# Patient Record
Sex: FEMALE
[De-identification: names, ages, dates, MRNs, and addresses within clinical notes are randomized per-mention and may not be internally consistent; named-entity substitution may affect disease eponyms.]

---

## 2019-03-17 ENCOUNTER — HOSPITAL ENCOUNTER (EMERGENCY)
Dept: HOSPITAL 14 - H.ER | Age: 54
LOS: 1 days | Discharge: HOME | End: 2019-03-18
Payer: MEDICAID

## 2019-03-17 DIAGNOSIS — F32.9: Primary | ICD-10-CM

## 2019-03-17 DIAGNOSIS — I10: ICD-10-CM

## 2019-03-17 DIAGNOSIS — Z88.0: ICD-10-CM

## 2019-03-17 DIAGNOSIS — Z00.8: ICD-10-CM

## 2019-03-17 DIAGNOSIS — Z86.59: ICD-10-CM

## 2019-03-17 PROCEDURE — 99282 EMERGENCY DEPT VISIT SF MDM: CPT

## 2019-03-18 VITALS
RESPIRATION RATE: 16 BRPM | HEART RATE: 100 BPM | OXYGEN SATURATION: 100 % | TEMPERATURE: 98 F | DIASTOLIC BLOOD PRESSURE: 105 MMHG | SYSTOLIC BLOOD PRESSURE: 165 MMHG

## 2019-03-18 NOTE — ED PDOC
HPI: Psych/Substance Abuse


Time Seen by Provider: 19 00:20


Chief Complaint (Nursing): Psychiatric Evaluation


Chief Complaint (Provider): Psychiatric Evaluation


History Per: Patient, Family (daughter)


History/Exam Limitations: no limitations


Associated Symptoms: Depression


Additional Complaint(s): 





52 y/o female with history of depression and anxiety presenting to the ED for 

psychiatric evaluation. Patient is from [place] and has been here for

x2 months visiting her daughter. Patient reports she did not bring her 

psychiatric medications and is unaware of the names. Per daughter patient 

appears to be increasingly depressed and is unable to sleep. Patient denies 

homicidal or suicidal ideation as well as auditory or visual hallucinations. 

Patient and her daughter's main concern is to see a psychiatrist and get some 

sleep aide.





Past Medical History


Reviewed: Historical Data, Nursing Documentation, Vital Signs


Vital Signs: 





                                Last Vital Signs











Temp  98 F   19 23:59


 


Pulse  100 H  19 23:59


 


Resp  16   19 23:59


 


BP  165/105 H  19 23:59


 


Pulse Ox  100   19 23:59














- Medical History


PMH: Anxiety, Depression, HTN





- Surgical History


Surgical History: 


Other surgeries: Tubal Ligation, Hysterectomy





- Family History


Family History: States: Unknown Family Hx





- Social History


Current smoker - smoking cessation education provided: No


Alcohol: None


Drugs: Denies





- Home Medications


Home Medications: 


                                Ambulatory Orders











 Medication  Instructions  Recorded


 


hydrOXYzine Pamoate [Vistaril] 25 mg PO HS PRN #7 cap 19














- Allergies


Allergies/Adverse Reactions: 


                                    Allergies











Allergy/AdvReac Type Severity Reaction Status Date / Time


 


Penicillins Allergy  RASH Verified 19 00:03














Review of Systems


ROS Statement: Except As Marked, All Systems Reviewed And Found Negative


Psych: Positive for: Depression.  Negative for: Psychosis, Suicidal ideation





Physical Exam





- Reviewed


Nursing Documentation Reviewed: Yes


Vital Signs Reviewed: Yes





- Physical Exam


Appears: Positive for: Well, Non-toxic, No Acute Distress


Head Exam: Positive for: ATRAUMATIC, NORMAL INSPECTION, NORMOCEPHALIC


Skin: Positive for: Normal Color, Warm, DRY


Eye Exam: Positive for: EOMI, Normal appearance, PERRL


ENT: Positive for: Normal ENT Inspection


Neck: Positive for: Normal, Painless ROM


Cardiovascular/Chest: Positive for: Regular Rate, Rhythm.  Negative for: Murmur


Respiratory: Positive for: Normal Breath Sounds.  Negative for: Respiratory 

Distress


Gastrointestinal/Abdominal: Positive for: Normal Exam, Soft.  Negative for: 

Tenderness


Back: Positive for: Normal Inspection


Extremity: Positive for: Normal ROM.  Negative for: Pedal Edema, Deformity


Neurological/Psych: Positive for: Awake, Alert, Normal Tone, Mood/Affect (flat).

  Negative for: Motor/Sensory Deficits





- ECG


O2 Sat by Pulse Oximetry: 100 (RA)


Pulse Ox Interpretation: Normal





Medical Decision Making


Medical Decision Making: 





Time: 00:20


Impression: 52 y/o with insomnia in setting of depression


Initial Plan:


* Crisis evaluation





01:24


Patient was cleared by crisis. Diagnosis is depression. Will be discharged home.

 











------------------------------------------------------------------------------

-------------------


Scribe Attestation:


Documented by Sandro Dunbar, acting as a scribe Krista Branham MD. 





Provider Scribe Attestation:


All medical record entries made by the Scribe were at my direction and 

personally dictated by me. I have reviewed the chart and agree that the record 

accurately reflects my personal performance of the history, physical exam, 

medical decision making, and the department course for this patient. I have also

 personally directed, reviewed, and agree with the discharge instructions and 

disposition.





Disposition





- Clinical Impression


Clinical Impression: 


 Depression, Insomnia








- Patient ED Disposition


Is Patient to be Admitted: No





- Disposition


Disposition: Routine/Home


Disposition Time: 01:24


Condition: STABLE


Additional Instructions: 





NANCIE MCELROY, thank you for letting us take care of you today. Your 

provider was Nitin Branham MD and you were treated for PSYCH EVAL. The 

emergency medical care you received today was directed at your acute symptoms. 

If you were prescribed any medication, please fill it and take as directed. It 

may take several days for your symptoms to resolve. Return to the Emergency 

Department if your symptoms worsen, do not improve, or if you have any other 

problems.





Please contact your doctor or call one of the physicians/clinics you have been 

referred to that are listed on the Patient Visit Information form that is 

included in your discharge packet. Bring any paperwork you were given at 

discharge with you along with any medications you are taking to your follow up 

visit. Our treatment cannot replace ongoing medical care by a primary care 

provider outside of the emergency department.





Thank you for allowing the [facility] team to be part of your care today.








If you had an X-Ray or CT scan: A Radiologist will review the ED reading if any 

change in treatment is needed we will contact you.***





If you had a blood, urine, or wound culture: It will take several days for the 

results, if any change in treatment is needed we will contact you.***





If you had an STI test: It will take 48 hours for the results. Please call after

 1 week if you have not heard back.***


Prescriptions: 


hydrOXYzine Pamoate [Vistaril] 25 mg PO HS PRN #7 cap


 PRN Reason: for insomnia


Instructions:  Depression


Forms:  [employer] (English)


Print Language: Belarusian

## 2019-03-23 ENCOUNTER — HOSPITAL ENCOUNTER (INPATIENT)
Dept: HOSPITAL 14 - H.ER | Age: 54
LOS: 6 days | Discharge: HOME | DRG: 753 | End: 2019-03-29
Attending: PSYCHIATRY & NEUROLOGY | Admitting: PSYCHIATRY & NEUROLOGY
Payer: MEDICAID

## 2019-03-23 DIAGNOSIS — I10: ICD-10-CM

## 2019-03-23 DIAGNOSIS — Z98.891: ICD-10-CM

## 2019-03-23 DIAGNOSIS — F05: ICD-10-CM

## 2019-03-23 DIAGNOSIS — N39.0: ICD-10-CM

## 2019-03-23 DIAGNOSIS — F31.64: Primary | ICD-10-CM

## 2019-03-23 DIAGNOSIS — F41.9: ICD-10-CM

## 2019-03-23 PROCEDURE — GZ3ZZZZ MEDICATION MANAGEMENT: ICD-10-PCS | Performed by: PSYCHIATRY & NEUROLOGY

## 2019-03-23 PROCEDURE — GZ56ZZZ INDIVIDUAL PSYCHOTHERAPY, SUPPORTIVE: ICD-10-PCS | Performed by: PSYCHIATRY & NEUROLOGY

## 2019-03-23 PROCEDURE — GZHZZZZ GROUP PSYCHOTHERAPY: ICD-10-PCS | Performed by: PSYCHIATRY & NEUROLOGY

## 2019-03-23 NOTE — ED PDOC
HPI: Psych/Substance Abuse


Time Seen by Provider: 19 21:00


Chief Complaint (Nursing): Psychiatric Evaluation


Chief Complaint (Provider): psychiatric evaluation


History Per: Family


History/Exam Limitations: no limitations


Onset/Duration Of Symptoms: Days


Current Symptoms Are (Timing): Still Present


Associated Symptoms: denies: Suicidal Thoughts


Additional Complaint(s): 





Tanvi Benavidez is a 53 year old female, with a past medical history of 

psychiatric illness, who presents to the emergency department accompanied by 

family members for psychiatric evaluation. Family members report that patient 

arrived from [place] x2 months ago where she was receiving a 

treatment of Lithium Carbonate 200mg, Lamotrigine 100mg and Olanzapine 10mg. 

Family states that patient forgot her medication at home and hasn't been 

sleeping for x2 days. Patient was seen here last week and was given Benadryl but

with no benefit. Patient has a scheduled appointment with psychiatrist at [facility] in [place] on  but family is requesting a medication

to help her sleep due to concern of child at home. Patient denies any suicidal 

or homicidal ideation. No further medical complaints.





PMD: None provided. 





Past Medical History


Reviewed: Historical Data, Nursing Documentation, Vital Signs


Vital Signs: 





                                Last Vital Signs











Temp  98.7 F   19 20:33


 


Pulse  89   19 20:33


 


Resp  16   19 20:33


 


BP  150/95 H  19 20:33


 


Pulse Ox  100   19 20:33














- Medical History


PMH: Anxiety, Depression, HTN


   Denies: Diabetes, Hepatitis, HIV, Seizures, Sexually Transmitted Disease





- Surgical History


Surgical History: 





- Family History


Family History: States: Unknown Family Hx





- Living Arrangements


Living Arrangements: With Family





- Home Medications


Home Medications: 


                                Ambulatory Orders











 Medication  Instructions  Recorded


 


hydrOXYzine Pamoate [Vistaril] 25 mg PO HS PRN #7 cap 19














- Allergies


Allergies/Adverse Reactions: 


                                    Allergies











Allergy/AdvReac Type Severity Reaction Status Date / Time


 


Penicillins Allergy  RASH Verified 19 00:03














Review of Systems


ROS Statement: Except As Marked, All Systems Reviewed And Found Negative


Psych: Positive for: Other (insomnia).  Negative for: Suicidal ideation (or 

homicidal ideation)





Physical Exam





- Reviewed


Nursing Documentation Reviewed: Yes


Vital Signs Reviewed: Yes





- Physical Exam


Appears: Positive for: No Acute Distress


Head Exam: Positive for: ATRAUMATIC, NORMAL INSPECTION, NORMOCEPHALIC


Skin: Positive for: Normal Color, Warm, Dry


Eye Exam: Positive for: Normal appearance, EOMI, PERRL


Neck: Positive for: Normal, Painless ROM


Cardiovascular/Chest: Positive for: Regular Rate, Rhythm.  Negative for: Murmur


Respiratory: Positive for: Normal Breath Sounds.  Negative for: Respiratory 

Distress


Gastrointestinal/Abdominal: Positive for: Normal Exam, Soft.  Negative for: 

Tenderness


Back: Positive for: Normal Inspection


Extremity: Positive for: Normal ROM (upper and lower extremities).  Negative 

for: Deformity


Neurological/Psych: Positive for: Awake, Alert, Normal Tone, Oriented





- ECG


O2 Sat by Pulse Oximetry: 100 (RA)


Pulse Ox Interpretation: Normal





- Progress


ED Course And Treament: 





SEEN BY CRISIS





D/W BRICE YARBROUGH





ADMIT TO DR. TREJO DIAGNOSIS BIPOLAR DISORDER.





Medical Decision Making


Medical Decision Making: 





Time: 21:00


Initial Impression: Psychiatric evaluation





Initial Plan:





--Reevaluation











 

--------------------------------------------------------------------------------


-----------------


Scribe Attestation:


Documented by Zhen Leung, acting as a scribe for Rena Mcgrath PA-C.





Provider Scribe Attestation:


All medical record entries made by the Scribe were at my direction and 

personally dictated by me. I have reviewed the chart and agree that the record 

accurately reflects my personal performance of the history, physical exam, 

medical decision making, and the department course for this patient. I have also

 personally directed, reviewed, and agree with the discharge instructions and 

disposition.





Disposition





- Clinical Impression


Clinical Impression: 


 Bipolar disorder








- Patient ED Disposition


Is Patient to be Admitted: Yes





- Disposition


Disposition Time: 23:17


Condition: FAIR


Instructions:  Bipolar Disorder (DC)

## 2019-03-24 VITALS — OXYGEN SATURATION: 98 %

## 2019-03-24 LAB
ALBUMIN SERPL-MCNC: 4.9 G/DL (ref 3.5–5)
ALBUMIN/GLOB SERPL: 1.3 {RATIO} (ref 1–2.1)
ALT SERPL-CCNC: 27 U/L (ref 9–52)
AST SERPL-CCNC: 24 U/L (ref 14–36)
BACTERIA #/AREA URNS HPF: (no result) /[HPF]
BASOPHILS # BLD AUTO: 0.1 K/UL (ref 0–0.2)
BASOPHILS NFR BLD: 0.4 % (ref 0–2)
BILIRUB UR-MCNC: NEGATIVE MG/DL
BUN SERPL-MCNC: 13 MG/DL (ref 7–17)
CALCIUM SERPL-MCNC: 10.5 MG/DL (ref 8.4–10.2)
COLOR UR: YELLOW
EOSINOPHIL # BLD AUTO: 0.1 K/UL (ref 0–0.7)
EOSINOPHIL NFR BLD: 0.7 % (ref 0–4)
ERYTHROCYTE [DISTWIDTH] IN BLOOD BY AUTOMATED COUNT: 12.5 % (ref 11.5–14.5)
GFR NON-AFRICAN AMERICAN: > 60
GLUCOSE UR STRIP-MCNC: (no result) MG/DL
HGB BLD-MCNC: 13.9 G/DL (ref 12–16)
LEUKOCYTE ESTERASE UR-ACNC: (no result) LEU/UL
LYMPHOCYTES # BLD AUTO: 3.1 K/UL (ref 1–4.3)
LYMPHOCYTES NFR BLD AUTO: 20.9 % (ref 20–40)
MCH RBC QN AUTO: 31.4 PG (ref 27–31)
MCHC RBC AUTO-ENTMCNC: 33.8 G/DL (ref 33–37)
MCV RBC AUTO: 93 FL (ref 81–99)
MONOCYTES # BLD: 0.8 K/UL (ref 0–0.8)
MONOCYTES NFR BLD: 5.7 % (ref 0–10)
NEUTROPHILS # BLD: 10.6 K/UL (ref 1.8–7)
NEUTROPHILS NFR BLD AUTO: 72.3 % (ref 50–75)
NRBC BLD AUTO-RTO: 0.1 % (ref 0–0)
PH UR STRIP: 6 [PH] (ref 5–8)
PLATELET # BLD: 394 K/UL (ref 130–400)
PMV BLD AUTO: 7.7 FL (ref 7.2–11.7)
PROT UR STRIP-MCNC: NEGATIVE MG/DL
RBC # BLD AUTO: 4.43 MIL/UL (ref 3.8–5.2)
RBC # UR STRIP: NEGATIVE /UL
SP GR UR STRIP: 1.02 (ref 1–1.03)
SQUAMOUS EPITHIAL: 3 /HPF (ref 0–5)
URINE CLARITY: (no result)
UROBILINOGEN UR-MCNC: (no result) MG/DL (ref 0.2–1)
WBC # BLD AUTO: 14.7 K/UL (ref 4.8–10.8)

## 2019-03-24 NOTE — RAD
Date of service: 



03/23/2019



HISTORY:

 ROUTINE 



COMPARISON:

No prior. 



TECHNIQUE:

1 view obtained.



FINDINGS:



LUNGS:

No active pulmonary disease.



PLEURA:

No significant pleural effusion identified, no pneumothorax apparent.



CARDIOVASCULAR:

No aortic atherosclerotic calcification present.



Normal cardiac size. No pulmonary vascular congestion. 



OSSEOUS STRUCTURES:

No significant abnormalities.



VISUALIZED UPPER ABDOMEN:

Normal.



OTHER FINDINGS:

None.



IMPRESSION:

No active disease.

## 2019-03-24 NOTE — CP.PCM.CON
History of Present Illness





- History of Present Illness


History of Present Illness: 





54 yo female with psyche history admitted to psyche unit because worsening 

depression.














Review of Systems





- Review of Systems


All systems: reviewed and no additional remarkable complaints except (aside from

those mentioned above, 12 point system review were negative by me)





Past Patient History





- Tetanus Immunizations


Tetanus Immunization: Unknown





- Past Social History


Smoking Status: Never Smoked


Chewing Tobacco Use: No


Cigar Use: No


Alcohol: None


Drugs: Denies





- CARDIAC


Hx Hypertension: Yes





- PULMONARY


Hx Tuberculosis: No





- NEUROLOGICAL


HX Cerebrovascular Accident: No


Hx Seizures: No





- HEMATOLOGICAL/ONCOLOGICAL


Hx Cancer: No


Hx Human Immunodeficiency Virus (HIV): No





- GENITOURINARY/GYNECOLOGICAL


Hx Sexually Transmitted Disorders: No





- PSYCHIATRIC


Hx Bipolar Disorder: Yes (per report)


Hx Substance Use: No





- SURGICAL HISTORY


Hx Surgeries: Yes


Hx  Section: Yes





- ANESTHESIA


Hx Anesthesia: Yes


Hx Anesthesia Reactions: No


Hx Malignant Hyperthermia: No


Has any member of the family had a problem w/ anesthesia?: No





Meds


Allergies/Adverse Reactions: 


                                    Allergies











Allergy/AdvReac Type Severity Reaction Status Date / Time


 


Penicillins Allergy  RASH Verified 19 00:03














- Medications


Medications: 


                               Current Medications





Acetaminophen (Tylenol 325mg Tab)  650 mg PO Q4 PRN


   PRN Reason: pain level 4-7


Al Hydrox/Mg Hydrox/Simethicone (Maalox Plus 30 Ml)  30 ml PO Q4 PRN


   PRN Reason: Dyspepsia


Diphenhydramine HCl (Benadryl)  50 mg IM Q6 PRN


   PRN Reason: Extrapyramidal S/S Unable PO


Diphenhydramine HCl (Benadryl)  50 mg PO Q6 PRN


   PRN Reason: Extrapyramidal Symptoms


Diphenhydramine HCl (Benadryl)  50 mg PO HS PRN


   PRN Reason: Sleep


Haloperidol (Haldol)  5 mg PO Q4 PRN


   PRN Reason: Agitation


Haloperidol Lactate (Haldol)  5 mg IM Q4 PRN


   PRN Reason: Agitation, Unable to Take PO


Lorazepam (Ativan)  2 mg IM Q6H PRN


   PRN Reason: Anxiety/Agitation,Unable PO


Lorazepam (Ativan)  1 mg PO Q6H PRN


   PRN Reason: Anxiety/Agitation


Magnesium Hydroxide (Milk Of Magnesia)  30 ml PO HS PRN


   PRN Reason: Constipation











Physical Exam





- Constitutional


Appears: No Acute Distress





- Head Exam


Head Exam: ATRAUMATIC





- Eye Exam


Eye Exam: absent: Scleral icterus





- ENT Exam


ENT Exam: Mucous Membranes Moist





- Neck Exam


Neck exam: Negative for: Meningismus





- Respiratory Exam


Respiratory Exam: absent: Rales, Rhonchi, Wheezes, Respiratory Distress





- Cardiovascular Exam


Cardiovascular Exam: REGULAR RHYTHM, +S1, +S2





- GI/Abdominal Exam


GI & Abdominal Exam: Soft.  absent: Tenderness





- Rectal Exam


Rectal Exam: Deferred





- Neurological Exam


Neurological exam: Alert, Oriented x3





- Psychiatric Exam


Psychiatric exam: Normal Affect





- Skin


Skin Exam: Dry, Intact





Results





- Vital Signs


Recent Vital Signs: 


                                Last Vital Signs











Temp  98.2 F   19 09:00


 


Pulse  97 H  19 09:00


 


Resp  18   19 09:00


 


BP  155/102 H  19 09:00


 


Pulse Ox  100   19 07:13














- Labs


Result Diagrams: 


                                 19 00:10





                                 19 00:10


Labs: 


                         Laboratory Results - last 24 hr











  19





  00:10 00:10 00:10


 


WBC   14.7 H 


 


RBC   4.43 


 


Hgb   13.9 


 


Hct   41.2 


 


MCV   93.0 


 


MCH   31.4 H 


 


MCHC   33.8 


 


RDW   12.5 


 


Plt Count   394 


 


MPV   7.7 


 


Neut % (Auto)   72.3 


 


Lymph % (Auto)   20.9 


 


Mono % (Auto)   5.7 


 


Eos % (Auto)   0.7 


 


Baso % (Auto)   0.4 


 


Neut # (Auto)   10.6 H 


 


Lymph # (Auto)   3.1 


 


Mono # (Auto)   0.8 


 


Eos # (Auto)   0.1 


 


Baso # (Auto)   0.1 


 


Sodium  142  


 


Potassium  4.5  


 


Chloride  101  


 


Carbon Dioxide  27  


 


Anion Gap  19  


 


BUN  13  


 


Creatinine  0.7  


 


Est GFR ( Amer)  > 60  


 


Est GFR (Non-Af Amer)  > 60  


 


Random Glucose  112 H  


 


Calcium  10.5 H  


 


Total Bilirubin  0.4  


 


AST  24  


 


ALT  27  


 


Alkaline Phosphatase  116  


 


Total Protein  8.9 H  


 


Albumin  4.9  


 


Globulin  3.9  


 


Albumin/Globulin Ratio  1.3  


 


Urine Color   


 


Urine Clarity   


 


Urine pH   


 


Ur Specific Gravity   


 


Urine Protein   


 


Urine Glucose (UA)   


 


Urine Ketones   


 


Urine Blood   


 


Urine Nitrate   


 


Urine Bilirubin   


 


Urine Urobilinogen   


 


Ur Leukocyte Esterase   


 


Urine RBC (Auto)   


 


Urine Microscopic WBC   


 


Ur Squamous Epith Cells   


 


Urine Bacteria   


 


Urine Opiates Screen    Negative


 


Urine Methadone Screen    Negative


 


Ur Barbiturates Screen    Negative


 


Ur Phencyclidine Scrn    Negative


 


Ur Amphetamines Screen    Negative


 


U Benzodiazepines Scrn    Negative


 


U Oth Cocaine Metabols    Negative


 


U Cannabinoids Screen    Negative


 


Alcohol, Quantitative  < 10  














  19





  00:10


 


WBC 


 


RBC 


 


Hgb 


 


Hct 


 


MCV 


 


MCH 


 


MCHC 


 


RDW 


 


Plt Count 


 


MPV 


 


Neut % (Auto) 


 


Lymph % (Auto) 


 


Mono % (Auto) 


 


Eos % (Auto) 


 


Baso % (Auto) 


 


Neut # (Auto) 


 


Lymph # (Auto) 


 


Mono # (Auto) 


 


Eos # (Auto) 


 


Baso # (Auto) 


 


Sodium 


 


Potassium 


 


Chloride 


 


Carbon Dioxide 


 


Anion Gap 


 


BUN 


 


Creatinine 


 


Est GFR ( Amer) 


 


Est GFR (Non-Af Amer) 


 


Random Glucose 


 


Calcium 


 


Total Bilirubin 


 


AST 


 


ALT 


 


Alkaline Phosphatase 


 


Total Protein 


 


Albumin 


 


Globulin 


 


Albumin/Globulin Ratio 


 


Urine Color  Yellow


 


Urine Clarity  Slighty-cloudy


 


Urine pH  6.0


 


Ur Specific Gravity  1.019


 


Urine Protein  Negative


 


Urine Glucose (UA)  Neg


 


Urine Ketones  Negative


 


Urine Blood  Negative


 


Urine Nitrate  Negative


 


Urine Bilirubin  Negative


 


Urine Urobilinogen  0.2-1.0


 


Ur Leukocyte Esterase  Large


 


Urine RBC (Auto)  1


 


Urine Microscopic WBC  3


 


Ur Squamous Epith Cells  3


 


Urine Bacteria  Rare


 


Urine Opiates Screen 


 


Urine Methadone Screen 


 


Ur Barbiturates Screen 


 


Ur Phencyclidine Scrn 


 


Ur Amphetamines Screen 


 


U Benzodiazepines Scrn 


 


U Oth Cocaine Metabols 


 


U Cannabinoids Screen 


 


Alcohol, Quantitative 














Assessment & Plan


(1) Depression


Status: Acute   


Comment: psyche is managing   





(2) UTI (urinary tract infection)


Status: Acute   


Comment: urine C&S.  Macrobid 100mg PO BID

## 2019-03-24 NOTE — PCM.PSYCH
Initial Psychiatric Evaluation





- Initial Psychiatric Evaluation


Type of Admission: Voluntary


Chief Complaint (in patient's own words): 





may family brought to the hospital because for not sleeping 


Patient's Reaction to Hospitalization: 





signed in voluntary


History of Present Illness and Precipitating Events: 





pt reportedly was living in youngest daughter reportedly family called ems 

because pt has not slept in 3 days reportedly was pacing at home became upset 

reported broke door does not recall why. reportedly [place] approx[place] 2-3 

months, came from [place] reportedly was diagnosed with ? bipolar. 

does not recall medication, does not admit to ever being inpt, denies suicidal 

ideation/attempts. reportedly pt was walking back and forth at sirisha's home, 

reportedly was scaring daughter's children (pacing) 


Current Medications: 





Active Medications











Generic Name Dose Route Start Last Admin





  Trade Name Freq  PRN Reason Stop Dose Admin


 


Acetaminophen  650 mg  03/24/19 03:02  





  Tylenol 325mg Tab  PO   





  Q4 PRN   





  pain level 4-7   





     





     





     


 


Al Hydrox/Mg Hydrox/Simethicone  30 ml  03/24/19 03:02  





  Maalox Plus 30 Ml  PO   





  Q4 PRN   





  Dyspepsia   





     





     





     


 


Diphenhydramine HCl  50 mg  03/24/19 03:02  





  Benadryl  IM   





  Q6 PRN   





  Extrapyramidal S/S Unable PO   





     





     





     


 


Diphenhydramine HCl  50 mg  03/24/19 03:02  





  Benadryl  PO   





  Q6 PRN   





  Extrapyramidal Symptoms   





     





     





     


 


Diphenhydramine HCl  50 mg  03/24/19 03:05  





  Benadryl  PO   





  HS PRN   





  Sleep   





     





     





     


 


Haloperidol  5 mg  03/24/19 03:02  





  Haldol  PO   





  Q4 PRN   





  Agitation   





     





     





     


 


Haloperidol Lactate  5 mg  03/24/19 03:02  





  Haldol  IM   





  Q4 PRN   





  Agitation, Unable to Take PO   





     





     





     


 


Lorazepam  2 mg  03/24/19 03:02  





  Ativan  IM   





  Q6H PRN   





  Anxiety/Agitation,Unable PO   





     





     





     


 


Lorazepam  1 mg  03/24/19 03:02  





  Ativan  PO   





  Q6H PRN   





  Anxiety/Agitation   





     





     





     


 


Magnesium Hydroxide  30 ml  03/24/19 03:02  





  Milk Of Magnesia  PO   





  HS PRN   





  Constipation   





     





     





     


 


Nitrofurantoin Macrocrystals  100 mg  03/24/19 21:00  





  Macrobid  PO   





  Q12 SORIN   





     





     





  Protocol   





     














Past Psychiatric History





- Past Psychiatric History


Prior Professional Help: opd in [facility] [place] defers recalling treatment


History of Abuse: 





defers


History of ETOH/Drug Use: 





defers


History of Family Illness: 





defers


Pertinent Medical Hx (Current Medical&Sleep Prob, Allergies): 





                                    Allergies











Allergy/AdvReac Type Severity Reaction Status Date / Time


 


Penicillins Allergy  RASH Verified 03/18/19 00:03








                                        





hydrOXYzine Pamoate [Vistaril] 25 mg PO HS PRN #7 cap 03/18/19 


Home Med 0.5 tab PO DAILY 03/24/19 











Review of Systems





- Psychiatric


Psychiatric: Abnormal Sleep Pattern, Irritability





Mental Status Examination





- Personal Presentation


Personal Presentation: Looks older than stated age





- Affect


Affect: Constricted





- Motor Activity


Motor Activity: Psychomotor Retardation





- Reliability in Providing Information


Reliability in Providing Information: Fair





- Speech


Additional comments: 





under productive unless prompted





- Mood


Mood: Anxious





- Formal Thought Process


Formal Thought Process: No Impairment





- Cognitive Functions


Orientation: Person, Place, Situation


Sensorium: Alert


Attention/Concentration: Easily distracted


Judgement: Imparied, as evidence by: Other


Memory: Recent intact, as evidence by: Other





- Risk


Risk: Diminished functioning





- Strength & Assets Inventory


Additional comments: 





pt gives minimal detail but with prompting 





- Limitations


Additional comments: 





speaks German, decreased insight





DSM 5 DX





- DSM 5


DSM 5 Diagnosis: 





bipolar I most recent episode manic with behavior changes





- Recommended/Plan of Treatment


Treatment Recommendations and Plan of Treatment: 





inpt adm per attending md


prns per unit protocol


vital signs and clinical observation per protocol and per clinical status


hospitalist consult 


in German review seroquel possible shakes tremors increased weight sugar 

cholesteral possible suicidal ideaions pt verbally agreeable will start seroquel

 25mg po hs


team to obtain corroborative information am


discharge planning in progess


Projected ELOS: 5-7 days


Prognosis: guarded


Discharge Plan and Discharge Criteria: 





safety





- Smoking Cessation


Smoking Cessation Initiated: No


Reason for not providing: defers

## 2019-03-24 NOTE — CARD
--------------- APPROVED REPORT --------------





Date of service: 03/23/2019



EKG Measurement

Heart [id number]

MN [id number]

[id number]

[id number]

[id number]



<Conclusion>

Normal sinus rhythm

Normal ECG

## 2019-03-24 NOTE — PCM.BM
<Mona Montague P - Last Filed: 03/24/19 03:54>





Treatment Plan Problems





- Problems identified on initial assessmt


  ** Anxiety


Date Initiated: 03/24/19


Time Initiated: 03:51


Assessment reference: NA


Status: Active





  ** Altered Sleep Patterns


Date Initiated: 03/24/19


Time Initiated: 03:51


Assessment reference: NA


Status: Active





  ** Medication non adherence


Date Initiated: 03/24/19


Time Initiated: 03:51


Assessment reference: NA


Status: Active





Treatment assets and liabiliti


Patient Assests: ADL independent, physically healthy, negotiates basic needs, 

cognitively intact


Patient Liabilities: medical problems (med noncompliance), language/speech, 

other (med non)





- Milieu Protocol


Maintain good personal hygiene: daily Encourage regular showers, daily Remind 

patient to perform daily oral care, daily Assist patient to perform ADL's


Conduct patient checks and document Observation sheet: Q15 minutes


Maintain personal safety: every shift Educate patient to report safety concerns 

to staff, every shift Monitor environment for contraband/sharps


Medication safety: Monitor for expected outcome, potential side effects: every 

shift, Assess barriers to learning: every shift, Assess readiness for medication

education: every shift





<Mary Ann Guerrero - Last Filed: 03/27/19 13:33>





Treatment assets and liabiliti


Patient Assests: adapts well, cooperative, educated (Pt. reports having a yanely

high education. Pt. reports discontinuing studies to help provide for family.), 

resourceful, self-reliant, good support system (Pt. reports having a loving and 

supportive relationship with children. Pt. reports occasional communication with

siblings in [place] ( 4 sisters ; 3 brothers).), negotiates basic needs, good past tx

response, cognitively intact


Patient Liabilities: language/speech (Pt. primarily Mongolian speaking ), other





Family Contact


Family involvement: Family/SO is involved


Family contact: Patient agrees to contact, Family has been contacted by patient,

Telephone contact initiated by staff


Family contact name: Marcus(daughter)([phone])


Family contact comment: Writer placed call to pts daughter  to discuss pts 

presentation on 3NP, collect further collateral, and address any family 

concerns. Writer provided clinical updates regarding pts presentation of 3NP 

this morning (thought blocking, paranoia, confusion, and initial refusal of 

medications). Pts daughter reported having spoken to earlier today and that pt. 

was able to explain to daughter that she needed to be given medication this 

morning and was able to carry out a short conversation. Writer provided 

psychoeducation regarding nature of tx provided on 3NP and importance of 

adherence with appropriate aftercare. Pts daughter expressed understanding of 

the above and denied having other concerns. Writer to continue providing 

clinical updates regarding pts progress and discharge planning.  ** 

Electronically signed by Mary Ann Guerrero MSW on 03/25/19 14:04 **





- Goals for Treatment


Patient goals for treatment: Patient to continue stabilization on 3NP through 

medication management and group/supportive therapy to address sxs of depression 

as exhibited by thought blocking, sleep disturbances, and paranoia. Patient to 

be encouraged to attend groups regularly to promote self-awareness, reality 

testing, and improve insight, compliance, coping skills and self-esteem. Patient

to be provided with referral for appropriate level of aftercare to reduce risk 

of future hospitalizations and ensure safety in the community.  Pt. identified 

improvement in sleep and being discharged home as primary tx goals.





Discharge/Continuing Care





- Education Needs


Education Needs: Family Medication, Family Diagnosis/Disease Process, Family 

Community resources, Family Aftercare Safety Plan, Patient Medication, Patient 

Diagnosis/Disease Process, Patient Coping Skills, Patient Community resources, 

Patient Aftercare Safety Plan





- Discharge


Discharge Criteria: Tolerates medication w/o severe side effects, Free of 

Suicidal thoughts, Free of paranoid thoughts, Free of agitation, Normal sleep 

pattern, Ability to care for self, Reduction of target symptoms (thought 

blocking)


Discharge to:: Home, With Family





- Treatment Team Participation


Patient/Family/SO Statement: 





03/27/19 13:38


Pt. attended tx team this morning to discuss progress on 3NP and tx goals. Pt. 

presented with significantly less thought blocking and was better able to engage

in discussion with tx team. Affect more congruent to mood, although pt. smiles 

inappropriately at times. Pt. presents as internally preoccupied but denies 

AH/VH/delusions. Pt. reports improvement in sxs since admission as exhibited by 

improvement in energy/motivation/sleep. Pt. continues to deny having experienced

sxs of depression or anxiety prior to admission, identifying reason for 

hospitalization as lack of sleep. Pt. suspicious on approach and guarded but 

to a lesser degree than upon admission. Pt. discharged focused, requesting to be

discharged today. Psychoeducation regarding importance or proper stabilization 

and adherence with aftercare provided. Pt. superficially motivated for tx and 

ambivalent towards aftercare but receptive to feedback. Recommended medication 

changes discussed at length. Pt. agreeable. Insight into precursors to 

hospitalization, illness, and need for tx remains limited. Pt. visible on 3NP 

and observed socializing appropriately with select peers. Staff availability e

mphasized. Pt. denied SI/HI, was able to contract for safety on 3NP, and 

expressed feeling safe on 3NP.


Discussed with Family/SO: Yes


Was Patient/Family/SO present at Treatment Team Meeting: Yes





<Shara Cota - Last Filed: 03/27/19 14:14>





- Diagnosis


(1) Bipolar disorder


Status: Acute   


Interventions: 





03/27/19 14:14


mood stabilizer/ risperidone 








(2) UTI (urinary tract infection)


Status: Acute   


Interventions: 





03/27/19 14:14


anti biotics/ internal medicine consult

## 2019-03-25 RX ADMIN — HALOPERIDOL SCH ML: 2 SOLUTION ORAL at 17:50

## 2019-03-25 RX ADMIN — HALOPERIDOL SCH ML: 2 SOLUTION ORAL at 14:00

## 2019-03-25 NOTE — PCM.PYCHPN
Psychiatric Progress Note





- Psychiatric Progress Note


Patient seen today, length of contact: pt evaluated discussed with team chart 

reviewed 


Patient Chief Complaint: 





I want to go to the dinning room


Problems Identified/Issues Discussed: 





pt on evaluation, floridly psychotic, internally preoccupied responding to 

internal stimuli, smiling inappropriately, thought form tangential with loose 

association, limited insight, denied command hallucinations, denied suicidal or 

homicidal ideation


DSM 5 Symptoms Update: 





bipolar I disorder MRE mixed severe with psychotic features


delirium hyperactive due to UTI 


Medication Change: Yes (start haldol )


Medical Record Reviewed: Yes





Mental Status Examination





- Cognitive Function


Orientation: Person, Place, Situation


Attention: Poor


Concentration: Poor


Association: Loose


Fund of Knowledge: Poor


Decription of patient's judgement and insights: 





poor insight and judgment 





- Mood


Mood: Anxious





- Affect


Affect: Broad


Additional comments: 





labile 





- Speech


Speech: Pressured





- Formal Thought Process


Formal Thought Process: Loosening of associations, Flight of ideas





- Suicidal Ideation


Suicidal Ideation: No





- Homicidal Ideation


Homicidal Ideation: No





Goal/Treatment Plan





- Goal/Treatment Plan


Need for Continued Stay: Severe depression anxiety


Progress Toward Problem(s) and Goals/Treatment Plan: 


start haldol 2mg tid/cogentin 1mg tid 


seroquel 100mg qhs 


internal medicine consult for UTI 


monitor pt for psychopharmacological effects and side effect profile

## 2019-03-26 LAB
BASOPHILS # BLD AUTO: 0 K/UL (ref 0–0.2)
BASOPHILS NFR BLD: 0.4 % (ref 0–2)
EOSINOPHIL # BLD AUTO: 0.2 K/UL (ref 0–0.7)
EOSINOPHIL NFR BLD: 1.5 % (ref 0–4)
ERYTHROCYTE [DISTWIDTH] IN BLOOD BY AUTOMATED COUNT: 12.6 % (ref 11.5–14.5)
HDLC SERPL-MCNC: 40 MG/DL (ref 30–70)
HGB BLD-MCNC: 14.3 G/DL (ref 12–16)
LDLC SERPL-MCNC: 85 MG/DL (ref 0–129)
LYMPHOCYTES # BLD AUTO: 2.5 K/UL (ref 1–4.3)
LYMPHOCYTES NFR BLD AUTO: 23.6 % (ref 20–40)
MCH RBC QN AUTO: 31.4 PG (ref 27–31)
MCHC RBC AUTO-ENTMCNC: 33.9 G/DL (ref 33–37)
MCV RBC AUTO: 92.6 FL (ref 81–99)
MONOCYTES # BLD: 0.7 K/UL (ref 0–0.8)
MONOCYTES NFR BLD: 6.4 % (ref 0–10)
NEUTROPHILS # BLD: 7.3 K/UL (ref 1.8–7)
NEUTROPHILS NFR BLD AUTO: 68.1 % (ref 50–75)
NRBC BLD AUTO-RTO: 0.1 % (ref 0–0)
PLATELET # BLD: 325 K/UL (ref 130–400)
PMV BLD AUTO: 7.7 FL (ref 7.2–11.7)
RBC # BLD AUTO: 4.55 MIL/UL (ref 3.8–5.2)
WBC # BLD AUTO: 10.8 K/UL (ref 4.8–10.8)

## 2019-03-26 RX ADMIN — RISPERIDONE SCH MG: 2 TABLET, ORALLY DISINTEGRATING ORAL at 21:23

## 2019-03-26 RX ADMIN — HALOPERIDOL SCH ML: 2 SOLUTION ORAL at 10:18

## 2019-03-26 RX ADMIN — HALOPERIDOL SCH ML: 2 SOLUTION ORAL at 13:46

## 2019-03-26 NOTE — PCM.PYCHPN
Psychiatric Progress Note





- Psychiatric Progress Note


Patient seen today, length of contact: pt evaluated discussed with team chart 

reviewed 


Patient Chief Complaint: 





I was feeling bad and I do not know why


Problems Identified/Issues Discussed: 





pt evalauted with translation, today presenting calmer, less irritable and less 

disorganized, continues to have under productive speech, guarded , internally 

preoccupied, denied command hallucinations, concrete thought process with 

limited insight into illness 


pt denied suicidal or homicidal ideation


DSM 5 Symptoms Update: 





bipolar I DISORDER MIXED SEVERE WITH PSYCHOTIC FEATURES


HYPERACTIVE DELIRIUM DUE TO UTI


Medication Change: Yes (stat risperidone)


Medical Record Reviewed: Yes





Mental Status Examination





- Cognitive Function


Orientation: Person, Place, Situation


Attention: Poor


Concentration: Poor


Association: Loose


Fund of Knowledge: Poor


Decription of patient's judgement and insights: 





poor insight and judgment 





- Mood


Mood: Anxious





- Affect


Affect: Broad





- Speech


Speech: Pressured





- Formal Thought Process


Formal Thought Process: Loosening of associations, Flight of ideas





- Suicidal Ideation


Suicidal Ideation: No





- Homicidal Ideation


Homicidal Ideation: No





Goal/Treatment Plan





- Goal/Treatment Plan


Need for Continued Stay: Severe depression anxiety


Progress Toward Problem(s) and Goals/Treatment Plan: 


DISCONTINUE HALDOL


start risperidone mtab 2mg qhs with cogentin 0.5mg qhs


decrease seroquel to 50mg qhs 


monitor pt for psychopharmacological effects and side effect profile

## 2019-03-27 RX ADMIN — RISPERIDONE SCH MG: 1 TABLET, ORALLY DISINTEGRATING ORAL at 08:48

## 2019-03-27 RX ADMIN — RISPERIDONE SCH MG: 2 TABLET, ORALLY DISINTEGRATING ORAL at 21:05

## 2019-03-27 RX ADMIN — ALUMINUM HYDROXIDE, MAGNESIUM HYDROXIDE, AND SIMETHICONE PRN ML: 200; 200; 20 SUSPENSION ORAL at 12:39

## 2019-03-27 NOTE — PCM.PYCHPN
Psychiatric Progress Note





- Psychiatric Progress Note


Patient seen today, length of contact: pt evaluated discussed with team chart 

reviewed 


Patient Chief Complaint: 





I want to go home


Problems Identified/Issues Discussed: 





pt evaluated with treatment team, pt less disorganized, more cooperative, 

continues  to have episodes of thought blocking and appears  internally 

preoccupied, denied command hallucinations, concrete thought process with 

limited insight into illness 


pt denied suicidal or homicidal ideation


DSM 5 Symptoms Update: 





bipolar I disorder mixed severe with psychotic features 


Medication Change: Yes (start remeron)


Medical Record Reviewed: Yes





Mental Status Examination





- Cognitive Function


Orientation: Person, Place, Situation


Attention: Poor


Concentration: Poor


Association: Loose


Fund of Knowledge: Poor


Decription of patient's judgement and insights: 





poor insight and judgment 





- Mood


Mood: Anxious





- Affect


Affect: Broad





- Speech


Speech: Pressured





- Formal Thought Process


Formal Thought Process: Loosening of associations, Flight of ideas





- Suicidal Ideation


Suicidal Ideation: No





- Homicidal Ideation


Homicidal Ideation: No





Goal/Treatment Plan





- Goal/Treatment Plan


Need for Continued Stay: Severe depression anxiety


Progress Toward Problem(s) and Goals/Treatment Plan: 





start risperidone mtab 2mg qhs with cogentin 0.5mg qhs, 1mg daily


discontinue  seroquel 


start remeron 7.5mg qhs 


monitor pt for psychopharmacological effects and side effect profile

## 2019-03-28 VITALS — RESPIRATION RATE: 18 BRPM

## 2019-03-28 RX ADMIN — RISPERIDONE SCH MG: 1 TABLET, ORALLY DISINTEGRATING ORAL at 09:00

## 2019-03-28 RX ADMIN — ALUMINUM HYDROXIDE, MAGNESIUM HYDROXIDE, AND SIMETHICONE PRN ML: 200; 200; 20 SUSPENSION ORAL at 09:01

## 2019-03-28 NOTE — PCM.PYCHPN
Psychiatric Progress Note





- Psychiatric Progress Note


Patient seen today, length of contact: pt evaluated discussed with team chart 

reviewed 


Patient Chief Complaint: 





I slept better 


Problems Identified/Issues Discussed: 





pt evaluated . less irritable and less disorganized , presenting with concrete 

thought process and limited insight into illness , observed more interactive 

with other peers, no changes in sleep or appetite, denied command 

hallucinations, denied suicidal or homicidal ideation . no reported side effects

of medications 





DSM 5 Symptoms Update: 





bipolar I disorder 


Medication Change: No


Medical Record Reviewed: Yes





Mental Status Examination





- Cognitive Function


Orientation: Person, Place, Situation


Attention: Poor


Concentration: Poor


Association: Loose


Fund of Knowledge: Poor


Decription of patient's judgement and insights: 





poor insight and judgment 





- Mood


Mood: Anxious





- Affect


Affect: Broad





- Speech


Speech: Appropriate, Pressured





- Formal Thought Process


Formal Thought Process: Circumstantial





- Suicidal Ideation


Suicidal Ideation: No





- Homicidal Ideation


Homicidal Ideation: No





Goal/Treatment Plan





- Goal/Treatment Plan


Need for Continued Stay: Severe depression anxiety


Progress Toward Problem(s) and Goals/Treatment Plan: 





 risperidone mtab 2mg qhs with cogentin 0.5mg qhs, 1mg daily








monitor pt for psychopharmacological effects and side effect profile

## 2019-03-29 VITALS — SYSTOLIC BLOOD PRESSURE: 126 MMHG | HEART RATE: 108 BPM | DIASTOLIC BLOOD PRESSURE: 88 MMHG

## 2019-03-29 VITALS — TEMPERATURE: 97.3 F

## 2019-03-29 NOTE — PCM.PYCHDC
Mental Status Examination





- Mental Status Examination


Orientation: Person, Place, Situation


Memory: Intact


Mood: Neutral


Affect: Broad


Speech: Appropriate


Attention: WNL


Concentration: WNL


Association: WNL


Fund of Knowledge: WNL


Formal Thought Process: Circumstantial


Description of patient's judgement and insight: 





poor insight and judgment 


Psychotic Thoughts and Behaviors: 





pt on discharge denied any current psychotic symptoms, non elicited 


Suicidal Ideation: No


Current Homicidal Ideation?: No





Discharge Summary





- Discharge Note


Reason for Hospitalization: 





pt reportedly was living in youngest daughter reportedly family called ems 

because pt has not slept in 3 days reportedly was pacing at home became upset 

reported broke door does not recall why. [place] approx[place] 2-3 

months, came [place] [facility] [place] reportedly was diagnosed with ? bipolar. 

does not recall medication, does not admit to ever being inpt, denies suicidal 

ideation/attempts. reportedly pt was walking back and forth at sirisha's home, 

reportedly was scaring daughter's children (pacing) 


Consultations:: List each consultation separately and include:  1. Reason for 

request.  2. Findings.  3. Follow-up


Summary of Hospital Course include:: 1. Description of specific treatment plan 

utilized for patients during their course of treatmen.  2. Summarize the time-

course for resolution of acute symptoms and/or regressed behaviors.  3. Describe

issues identified and worked on during hospitalization.  4. Describe medication 

utilized.  5. Describe medical problems identified and treated.  6. Reassessment

of suicide risk


Summary of Hospital Course: 





pt on admission was psychotic , internally preoccupied, pt was initially started

on haldol which was cross tapered to risperidone


pt also had UTI, and after internal medicine consult was started on antibiotics 


pt thought process gradually presented with clearing off of the psychotic 

symptoms


pt was placed on remeron for depression and poor sleep, she gradually became 

compliant with treatment, no reported side effects of medications


on discharge mental status was stable , pt denied any current suicidal or 

homicidal ideation denied perceptual disturbances, non elicited 





- Diagnosis


(1) Bipolar disorder


Current Visit: Yes   Status: Acute   





(2) UTI (urinary tract infection)


Current Visit: Yes   Status: Acute   





- Final Diagnosis (DSM 5)


Condition upon Discharge: FAIR


DSM 5: 





bipolar I disorder MRE mixed severe with psychotic features


Disposition: HOME/ ROUTINE


Follow-up Treatment Plan: 





 risperidone mtab 2mg qhs with cogentin 0.5mg qhs, 1mg daily








monitor pt for psychopharmacological effects and side effect profile 








Prescriptions/Medication Reconciliation: 


amLODIPine [Norvasc] 5 mg PO DAILY 30 Days #30 tab


Benztropine [Cogentin] 0.5 mg PO HS 30 Days #30 tab


Mirtazapine [Remeron] 7.5 mg PO HS 30 Days #30 tab


risperiDONE [RisperDAL Tab] 1 mg PO DAILY 30 Days #30 tab


risperiDONE [RisperDAL Tab] 2 mg PO HS 30 Days #30 tab





- Antipsychotic Medications


Pt discharged on 2 or more routine antipsychotic medications: No
